# Patient Record
Sex: FEMALE | Race: WHITE | NOT HISPANIC OR LATINO | Employment: UNEMPLOYED | URBAN - METROPOLITAN AREA
[De-identification: names, ages, dates, MRNs, and addresses within clinical notes are randomized per-mention and may not be internally consistent; named-entity substitution may affect disease eponyms.]

---
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
## 2022-07-08 ENCOUNTER — ANNUAL EXAM (OUTPATIENT)
Dept: OBGYN CLINIC | Facility: CLINIC | Age: 22
End: 2022-07-08
Payer: MEDICARE

## 2022-07-08 VITALS
HEIGHT: 63 IN | SYSTOLIC BLOOD PRESSURE: 142 MMHG | DIASTOLIC BLOOD PRESSURE: 80 MMHG | BODY MASS INDEX: 25.16 KG/M2 | WEIGHT: 142 LBS

## 2022-07-08 DIAGNOSIS — Z12.39 ENCOUNTER FOR SCREENING BREAST EXAMINATION: ICD-10-CM

## 2022-07-08 DIAGNOSIS — Z30.41 SURVEILLANCE FOR BIRTH CONTROL, ORAL CONTRACEPTIVES: ICD-10-CM

## 2022-07-08 DIAGNOSIS — Z01.419 ENCOUNTER FOR WELL WOMAN EXAM: Primary | ICD-10-CM

## 2022-07-08 PROCEDURE — 99395 PREV VISIT EST AGE 18-39: CPT | Performed by: PHYSICIAN ASSISTANT

## 2022-07-08 NOTE — PROGRESS NOTES
Assessment/Plan:    No problem-specific Assessment & Plan notes found for this encounter  Diagnoses and all orders for this visit:    Encounter for well woman exam    Encounter for screening breast examination    Surveillance for birth control, oral contraceptives          Subjective:      Patient ID: Heidi Simpson is a 25 y o  female  Pt presents for her annual exam today--  She has no complaints  She has reg bleeding  no pelvic pain  On Leni  Bowel and bladder are regular  No breast concerns today      No pap today  rx leni  Daily mvi      The following portions of the patient's history were reviewed and updated as appropriate: allergies, current medications, past family history, past medical history, past social history, past surgical history and problem list     Review of Systems   Constitutional: Negative for chills, fever and unexpected weight change  Gastrointestinal: Negative for abdominal pain, blood in stool, constipation and diarrhea  Genitourinary: Negative  Objective:      /80   Ht 5' 3" (1 6 m)   Wt 64 4 kg (142 lb)   LMP 06/16/2022 (Exact Date)   BMI 25 15 kg/m²          Physical Exam  Vitals and nursing note reviewed  Constitutional:       Appearance: She is well-developed  HENT:      Head: Normocephalic and atraumatic  Chest:   Breasts:      Right: No inverted nipple, mass, nipple discharge or skin change  Left: No inverted nipple, mass, nipple discharge or skin change  Abdominal:      Palpations: Abdomen is soft  Genitourinary:     Exam position: Supine  Labia:         Right: No rash, tenderness or lesion  Left: No rash, tenderness or lesion  Vagina: Normal       Cervix: No cervical motion tenderness, discharge or friability  Adnexa:         Right: No mass, tenderness or fullness  Left: No mass, tenderness or fullness  Musculoskeletal:      Cervical back: Normal range of motion     Lymphadenopathy: Lower Body: No right inguinal adenopathy  No left inguinal adenopathy

## 2022-08-11 DIAGNOSIS — Z30.41 ENCOUNTER FOR SURVEILLANCE OF CONTRACEPTIVE PILLS: Primary | ICD-10-CM

## 2022-08-11 RX ORDER — DROSPIRENONE AND ETHINYL ESTRADIOL 0.03MG-3MG
1 KIT ORAL DAILY
Qty: 90 TABLET | Refills: 4 | Status: SHIPPED | OUTPATIENT
Start: 2022-08-11

## 2023-07-18 ENCOUNTER — ANNUAL EXAM (OUTPATIENT)
Dept: GYNECOLOGY | Facility: CLINIC | Age: 23
End: 2023-07-18
Payer: COMMERCIAL

## 2023-07-18 VITALS
SYSTOLIC BLOOD PRESSURE: 130 MMHG | DIASTOLIC BLOOD PRESSURE: 86 MMHG | WEIGHT: 149 LBS | HEIGHT: 62 IN | BODY MASS INDEX: 27.42 KG/M2

## 2023-07-18 DIAGNOSIS — Z12.39 ENCOUNTER FOR SCREENING BREAST EXAMINATION: ICD-10-CM

## 2023-07-18 DIAGNOSIS — Z01.419 ENCOUNTER FOR WELL WOMAN EXAM: Primary | ICD-10-CM

## 2023-07-18 DIAGNOSIS — N94.6 DYSMENORRHEA: ICD-10-CM

## 2023-07-18 DIAGNOSIS — Z30.41 SURVEILLANCE FOR BIRTH CONTROL, ORAL CONTRACEPTIVES: ICD-10-CM

## 2023-07-18 PROCEDURE — 99395 PREV VISIT EST AGE 18-39: CPT | Performed by: PHYSICIAN ASSISTANT

## 2023-07-18 RX ORDER — DROSPIRENONE AND ETHINYL ESTRADIOL 0.03MG-3MG
1 KIT ORAL DAILY
Qty: 90 TABLET | Refills: 4 | Status: SHIPPED | OUTPATIENT
Start: 2023-07-18

## 2023-07-18 NOTE — PROGRESS NOTES
Assessment/Plan:    No problem-specific Assessment & Plan notes found for this encounter. Diagnoses and all orders for this visit:    Encounter for well woman exam    Encounter for screening breast examination    Dysmenorrhea  -     drospirenone-ethinyl estradiol (LENI) 3-0.03 MG per tablet; Take 1 tablet by mouth daily    Surveillance for birth control, oral contraceptives  -     drospirenone-ethinyl estradiol (LENI) 3-0.03 MG per tablet; Take 1 tablet by mouth daily          Subjective:      Patient ID: Karol Nelson is a 23 y.o. female. Pt presents for her annual exam today--  She has no complaints  She has light bleeding  no pelvic pain  On OCP  Bowel and bladder are regular  No breast concerns today    No pap today. Rx Leni  Daily mvi    Heading back to [place] next month! The following portions of the patient's history were reviewed and updated as appropriate: allergies, current medications, past family history, past medical history, past social history, past surgical history and problem list.    Review of Systems   Constitutional: Negative for chills, fever and unexpected weight change. Gastrointestinal: Negative for abdominal pain, blood in stool, constipation and diarrhea. Genitourinary: Negative. Objective:      /86   Ht 5' 2" (1.575 m)   Wt 67.6 kg (149 lb)   LMP 06/21/2023 (Exact Date)   BMI 27.25 kg/m²          Physical Exam  Vitals and nursing note reviewed. Constitutional:       Appearance: She is well-developed. HENT:      Head: Normocephalic and atraumatic. Chest:   Breasts:     Right: No inverted nipple, mass, nipple discharge or skin change. Left: No inverted nipple, mass, nipple discharge or skin change. Abdominal:      Palpations: Abdomen is soft. Genitourinary:     Exam position: Supine. Labia:         Right: No rash, tenderness or lesion. Left: No rash, tenderness or lesion.        Vagina: Normal.      Cervix: No cervical motion tenderness, discharge or friability. Uterus: Normal.       Adnexa: Right adnexa normal and left adnexa normal.        Right: No mass, tenderness or fullness. Left: No mass, tenderness or fullness. Musculoskeletal:      Cervical back: Normal range of motion. Lymphadenopathy:      Lower Body: No right inguinal adenopathy. No left inguinal adenopathy.

## 2024-05-08 ENCOUNTER — TELEPHONE (OUTPATIENT)
Age: 24
End: 2024-05-08

## 2024-05-08 NOTE — TELEPHONE ENCOUNTER
Pt called stating she has an appt for July for an annual but will be going away for the [facility] in [place] [facility] beginning of June until December. She wanted to know is she able to sched annual before year is up and have it done before she leaves or can she have it done when she returns in December. If it can wait until she returns can she get enough BC to cover her while she is away asked to please call her back to further discuss and see how to proceed.

## 2024-05-21 ENCOUNTER — NURSE TRIAGE (OUTPATIENT)
Age: 24
End: 2024-05-21

## 2024-05-21 DIAGNOSIS — N83.202 CYST OF LEFT OVARY: Primary | ICD-10-CM

## 2024-05-21 DIAGNOSIS — R10.2 PELVIC PAIN: ICD-10-CM

## 2024-05-21 NOTE — TELEPHONE ENCOUNTER
Regarding: Cyst  ----- Message from Ermelinda MUNROE sent at 5/21/2024  4:24 PM EDT -----  Pt's Mom called stating pt has been in pain all day and crying. Pt went to the ER and was told to follow up with her OBGYN due to a cyst on her left ovary. Pt is supposed to be leaving for [employer] [facility] tomorrow and Mother is concerned about sending her with this problem. Mom asked to please call either number back hers or daughters as she is not sure what to do moving forward as her daughter is still in a lot of pain. No CTS available to transfer.

## 2024-05-21 NOTE — TELEPHONE ENCOUNTER
Reason for Disposition  • SEVERE pelvic pain and present < 1 hour     When provider is in office    Protocols used: Pelvic Pain - Female-ADULT-[place]

## 2024-05-21 NOTE — TELEPHONE ENCOUNTER
"Shannon just returned home from [facility] by ED for RIGHT lower quadrant pain. Advised to f/u with GYN for LEFT ovarian cyst.  F/U with GI for RIGHT lower quadrant pain.  She was advised to use motrin/tylenol for discomfort.     She reports she has been also having diarrhea apprx 5 times daily since Saturday- Encouraged rehydrate with water/gatorade.      She is inquiring on f/u recommendation to left ovarian cyst.  Leaving to return to college at [facility] tomorrow, will be leaving for [facility] on 6/2/2024.    Advised if at any time severe pain returns, she will need to be re-evaluated at ED while at [facility] or in [place].  Will forward to provider to determine appropriate f/u to imaging.    Kathi, imaging uploaded through care everywhere, please review and provide additional recommendations.     Answer Assessment - Initial Assessment Questions  1. LOCATION: \"Where does it hurt?\"       Right lower quadrant   2. RADIATION: \"Does the pain shoot anywhere else?\" (e.g., lower back, groin, thighs)      Into low back  3. ONSET: \"When did the pain begin?\" (e.g., minutes, hours or days ago)       Saturday generalized abdominal pain, today right lower quadrant  4. SUDDEN: \"Gradual or sudden onset?\"      sudden  5. PATTERN \"Does the pain come and go, or is it constant?\"     - If constant: \"Is it getting better, staying the same, or worsening?\"       (Note: Constant means the pain never goes away completely; most serious pain is constant and gets worse over time)      - If intermittent: \"How long does it last?\" \"Do you have pain now?\"      (Note: Intermittent means the pain goes away completely between bouts)      Constant  6. SEVERITY: \"How bad is the pain?\"  (e.g., Scale 1-10; mild, moderate, or severe)    - MILD (1-3): doesn't interfere with normal activities, area soft and not tender to touch     - MODERATE (4-7): interferes with normal activities or awakens from sleep, tender to touch     - SEVERE " "(8-10): excruciating pain, doubled over, unable to do any normal activities       8  7. RECURRENT SYMPTOM: \"Have you ever had this type of pelvic pain before?\" If Yes, ask: \"When was the last time?\" and \"What happened that time?\"       denies  8. CAUSE: \"What do you think is causing the pelvic pain?\"      Per ED, unknown- f/u with GI for RLQ pain, f/u with GYN for LEFT OVARIAN cyst  9. RELIEVING/AGGRAVATING FACTORS: \"What makes it better or worse?\" (e.g., activity/rest, sexual intercourse, voiding, passing stool)      Nothing. Has not tried tylenol or advil as recommended by ED today  10. OTHER SYMPTOMS: \"Has there been any other symptoms?\" (e.g., fever, vaginal bleeding, vaginal discharge, diarrhea, constipation, or voiding problems?\"        Diarrhea starting on Saturday-less than 5 times per day  11. PREGNANCY: \"Is there any chance you are pregnant?\" \"When was your last menstrual period?\"        LMP 5/14 or 5/15/2024    Protocols used: Pelvic Pain - Female-ADULT-[place]    "

## 2024-05-23 NOTE — TELEPHONE ENCOUNTER
Patient advised and sent for pelvic U/S.     The patient said she is taking her birth control Leni daily.

## 2024-05-28 DIAGNOSIS — Z30.41 ENCOUNTER FOR SURVEILLANCE OF CONTRACEPTIVE PILLS: ICD-10-CM

## 2024-05-28 RX ORDER — DROSPIRENONE AND ETHINYL ESTRADIOL 0.03MG-3MG
1 KIT ORAL DAILY
Qty: 90 TABLET | Refills: 0 | Status: SHIPPED | OUTPATIENT
Start: 2024-05-28

## 2024-05-28 NOTE — TELEPHONE ENCOUNTER
Reason for call:   [x] Refill   [] Prior Auth  [] Other:     Office:   [] PCP/Provider -   [x] Specialty/Provider - Gynecology     Medication: drospirenone-ethinyl estradiol (CARTER)     Dose/Frequency: 1 tablet oral daily    Quantity: 90 tablets    Pharmacy: [facility] [address] [phone]     Does the patient have enough for 3 days?   [x] Yes   [] No - Send as HP to POD

## 2024-08-16 DIAGNOSIS — N94.6 DYSMENORRHEA: ICD-10-CM

## 2024-08-16 DIAGNOSIS — Z30.41 SURVEILLANCE FOR BIRTH CONTROL, ORAL CONTRACEPTIVES: ICD-10-CM

## 2024-08-16 RX ORDER — DROSPIRENONE AND ETHINYL ESTRADIOL 0.03MG-3MG
1 KIT ORAL DAILY
Qty: 84 TABLET | Refills: 1 | Status: SHIPPED | OUTPATIENT
Start: 2024-08-16

## 2025-05-19 RX ORDER — DROSPIRENONE AND ETHINYL ESTRADIOL 0.03MG-3MG
1 KIT ORAL DAILY
Qty: 84 TABLET | Refills: 1 | OUTPATIENT
Start: 2025-05-19